# Patient Record
(demographics unavailable — no encounter records)

---

## 2025-02-26 NOTE — WORK
[Crush Injury] : crush injury [Was the competent medical cause of the injury] : was the competent medical cause of the injury [Are consistent with the injury] : are consistent with the injury [Severe Partial] : severe partial [Can return to work with limitations on ______] : can return to work with limitations on [unfilled] [Lifting] : lifting [Operating heavy equipment] : operating heavy equipment [Pulling/Pushing] : pulling/pushing [Reaching at/below shoulder level] : reaching at or below shoulder level [Unknown at this time] : : unknown at this time [Patient] : patient

## 2025-02-26 NOTE — PHYSICAL EXAM
[de-identified] : Constitutional: The general appearance of the patient is well developed, well nourished, no deformities and well groomed. Normal \par  \par  Gait: Gait and function is as follows: normal gait. \par  \par  Skin: Head and neck visualized skin is normal. Left upper extremity visualized skin is normal. Right upper extremity visualized skin is normal. Thoracic Skin of the thoracic spine shows visualized skin is normal. \par  \par  Cardiovascular: palpable radial pulse bilaterally, good capillary refill in digits of the bilateral upper extremities and no temperature or color changes in the bilateral upper extremities. \par  \par  Lymphatic: Normal Palpation of lymph nodes in the cervical. \par  \par  Neurologic: fine motor control in the bilateral upper extremities is intact. Deep Tendon Reflexes in Upper and Lower Extremities Negative Carranza's in the bilateral upper extremities. The patient is oriented to time, place and person. Sensation to light touch intact in the bilateral upper extremities. Mood and Affect is normal. \par  \par  Right Shoulder: Inspection of the shoulder/upper arm is as follows: no scapula winging, no biceps deformity and no AC joint deformity. Palpation of the shoulder/upper arm is as follows: There is tenderness at the proximal biceps tendon. Range of motion of the shoulder is as follows: Pain with internal rotation, external rotation, abduction and forward flexion. Strength of the shoulder is as follows: Supraspinatus 4/5. External Rotation 4/5. Internal Rotation 4/5. Infrafraspinatus 5/5 4/5. Deltoid 5/5 Ligament Stability and Special Tests of the shoulder is as follows: Neer test is positive. Allen' test is positive. \par  \par  Left Shoulder: Inspection of the shoulder/upper arm is as follows: no scapula winging, no biceps deformity and no AC joint deformity. Palpation of the shoulder/upper arm is as follows: There is tenderness at the proximal biceps tendon. Range of motion of the shoulder is as follows: Pain with internal rotation, external rotation, abduction and forward flexion. Strength of the shoulder is as follows: Supraspinatus 4/5. External Rotation 4/5. Internal Rotation 4/5. Infrafraspinatus 5/5 4/5. Deltoid 5/5 Ligament Stability and Special Tests of the shoulder is as follows: Neer test is positive. Allen' test is positive. \par  \par  Neck: \par  Inspection / Palpation of the cervical spine is as follows: There is a full, pain free, stable range of motion of the cervical spine with normal tone and no tenderness to palpation. \par  \par  Back, including spine: Inspection / Palpation of the thoracic/lumbar spine is as follows: There is a full, pain free, stable range of motion of the thoracic spine with a normal tone and not tenderness to palpation.. \par  \par  \par

## 2025-02-26 NOTE — DISCUSSION/SUMMARY
[de-identified] : This is a 36 yo male returning for repeat evaluation of bilateral shoulder pain. With respect to his left shoulder he is currently 2 years status post rotator cuff augmentation and bicep tenodesis.  Patient has been completing home exercise program and stretching.   Pt reports over the last 5 days his shoulder has been numb  With respect to right shoulder patient has known full-thickness rotator cuff tear from MRI in 2024 Overall he has been experiencing worsening pain and weakness.  (Right) Rotator Cuff Tear Discussion for Patient Requesting Surgery. Pt has significant rotator cuff tear as injury to the biceps-labral complex and continues to report pain and weakness despite appropriate conservative treatment including focused HEP/therapy, injections, anti-inflammatory medication and activity modification. The patient is interested in pursuing surgical treatment. We had a lengthy discussion about the nature of arthroscopic rotator cuff repair surgery including the likelihood of addressing other pathology with arthroscopic Vs mini-open biceps tenodesis, subacromial decompression, and extensive debridement of any pathologic tissue encountered at the time of surgery.  I discussed the risks and benefits of both operative and non-operative treatment. I explained in detail the postoperative recovery including the duration of sling use and expectation for postoperative physical therapy. Explained that there is no guarantee however there is a high likelihood of functional improvement and pain relief. The patient understood all this was discussed.  The patient is indicated for a Right shoulder arthroscopic rotator cuff repair, biceps tenodesis, and subacromial decompression.  * Surgery: Considering patient has failed all conservative treatment we are requesting authorization for: -	Right shoulder arthroscopic rotator cuff repair (CPT code 21658), biceps tenodesis (CPT 56444),  debridement (CPT 86876) and Subacromial decompression (71103),  -	 Pt would like surgery (soon after approval) The patient will require a [place] 2.0 brace, cryotherapy, and 12 weeks of post-operative physical therapy. The patient will require a medical clearance  The doctor will require the [facility] representative, one hour, and one assistant.   Follow up 6 weeks   Recommended a Medrol Dosepak and discussed the dosing schedule and side effects of prednisone. We also recommend the patient contact their primary care physician to discuss any potential interactions with their current medications or health issues. Discussed he is a candidate for expedited arthroscopic rotator cuff repair and bicep tenodesis.   (1) We discussed a comprehensive treatment plans that included a prescription management plan involving the use of prescription strength medications to include Ibuprofen 600-800 mg TID, versus 500-650 mg Tylenol. We also discussed prescribing topical diclofenac (Voltaren gel) as well as once daily Meloxicam 15 mg. (2) The patient has More Than One chronic injuries/illnesses as outlined, discussed, and documented by ICD 10 codes listed, as well as the HPI and Plan section. There is a moderate risk of morbidity with further treatment, especially from use of prescription strength medications and possible side effects of these medications which include upset stomach and cardiac/renal issues with long term use were discussed. (3) I recommended that the patient follow-up with their medical physician to discuss any significant specific potential issues with long term use such as interactions with current medications or with exacerbation of underlying medical morbidities.   Attestation: I, Guera CLEMONS'Muriel , attest that this documentation has been prepared under the direction and in the presence of Provider Gary Downey MD. The documentation recorded by the scribe, in my presence, accurately reflects the service I personally performed, and the decisions made by me with my edits as appropriate. Gary Downey MD

## 2025-02-26 NOTE — REASON FOR VISIT
[FreeTextEntry2] : Left shoulder DOS: 11/16/23 - s/p Regeneten / Tenodesis Right shoulder - Full thickness rotator cuff tear

## 2025-02-26 NOTE — DATA REVIEWED
[MRI] : MRI [Right] : of the right [Shoulder] : shoulder [FreeTextEntry1] : 3/8/22 [facility]: 1. Mild partial tearing and delamination of the subscapularis tendon insertion.\par  2. Central superior labral tear with slight surrounding synovitis and effusion.\par  3. Moderate biceps tenosynovitis.\par  4. Mild supraspinatus and infraspinatus tendinopathy without tear.\par  5. AC joint arthrosis.\par  6. Fluid layering posteriorly suggesting recent therapeutic injection.\par  7. No acute fracture or muscle atrophy. [FreeTextEntry2] : 3/11/22 [facility]: 1. Full-thickness tearing and delamination involving the distal 2 cm of the supraspinatus tendon insertion \par  without retraction, high-grade tearing of the biceps tendon with possible full-thickness discontinuity of the \par  rotator interval, mild posterior tearing of the subscapularis tendon insertion and superior labral tearing with \par  infraspinatus tendinopathy, effusion, bursitis, and AC joint arthrosis.\par  2. Findings suggesting recent posterior lateral therapeutic injection.\par  3. No acute fracture or disproportionate muscle atrophy.

## 2025-02-26 NOTE — HISTORY OF PRESENT ILLNESS
[de-identified] : 35 yo patient presents with b/l shoulder pain from a work related injury on 1/29/22. Left worse than right at this point. He states during the snow storm he was shoveling and felt pain. He was unable to lift his LUE without pain the next day. His pain is deep to the anterior aspect of the left shoulder. For his right shoulder, he states his pain is localized to the posterior aspect of the shoulder. Denies any pain with his shoulders before the incident. He was taking Ibuprofen and previously prescribed Gabapentin with little relief. 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 He has been out of work since the injury.  3/4/22: patient to discuss his ongoing bilateral shoulder pain. He reports he did not take the Medrol Dosepak as prescribed last visit. He did not undergo an MRI which was recommended at last visit. He feels he has made some improvement.  3/11/22: Patient returns today for follow up of b/l shoulder pain. He reports significant difficulty attempting to return to work. He recently completed MRI of left shoulder and presents today for review.  4/15/22: Patient presents with ongoing b/l shoulder pain. Left worse than right at this time. He continues to have significant pain with movement overhead. Patient explains a constant pain. He completed MRI of the right shoulder which unfortunately reveals a full-thickness rotator cuff tear.  Previously requested physical therapy for both shoulders denied.  Patient states he has a Workmen's Comp. hearing.  He is still not able to work.  Left shoulder is more painful than right.  MRI of the right shoulder shows rotator cuff tear full-thickness. 6/13/22: Patient presents for repeat evaluation of b/l shoulder pain. L>R. He admits to subjective weakness to his right shoulder. Patient has constant pain with respect to his left shoulder. Admits to pain with ADLs and ROM.  11/21/22: patient returns today for repeat evaluation of left shoulder pain. He continues to note significant pain and discomfort. He reports pain is isolated to anterior aspect of the shoulder. Previous biceps injection provided significant pain relief x 2 months. He is now having trouble sleeping at night.  5/1/23: Patient presents for repeat evaluation of bilateral shoulder pain.  Left greater than right.  Has noticed worsening left anterior pain.  Also admits to radiating pain towards his neck.  Denies any numbness or tingling.  He reports he has court hearing at the end of this month to discuss status of his Worker's Comp. case and previous surgery request  7/12/23: Patient presents today for repeat evaluation of worsening bilateral shoulder pain.  Left pain is currently worse than right.  He states he had recent court hearing and was granted authorization work from [employer] (app) to have shoulders authorized on the case. 9/18/23: Patient returns for follow-up of bilateral shoulder pain.  Left is worse than right.  He has failed all conservative management wishes to discuss surgery. 11/29/23: Patient presents 2 weeks s/p left arthroscopic rotator cuff augmentation using regeneten, SAD and biceps tenodesis. Patient is doing well, pain is controlled.  Patient has been compliant with the brace. Denies any fever, chills, drainage. 7/17/24: Patient returns today for repeat evaluation of bilateral shoulder pain.  He is currently 8 months removed from left shoulder arthroscopic rotator cuff augmentation with Regeneten and biceps tenodesis.  Unfortunately patient was dealing with multiple personal matters and was unable to begin with physical therapy.  With respect to her right shoulder he has noticed worsening weakness and constant pain.  Patient does have known 2 cm rotator cuff tear. 2/26/25: Patient here for bilateral shoulder pain. Pt reports over the last 5 days his left shoulder has been numb. He is currently 2 years s/p left shoulder arthroscopic rotator cuff augmentation with Regeneten and biceps tenodesis. With respect to the right shoulder he continues to experience weakness and constant pain. Pt is currently not working since the surgery